# Patient Record
(demographics unavailable — no encounter records)

---

## 2018-02-02 NOTE — EMERGENCY ROOM REPORT
History of Present Illness


General


Chief Complaint:  Multiple Trauma/Fall


Source:  Patient





Present Illness


HPI


58 yo male presents to ER BIB ambulance s/p mechanical fall.


Patient complaining of left wrist pain.


Patient states pain is worse with movement.


Patient also states he is a psych patient and needs placement for "room and 

board."


Patient states he is currently taking medications for psych disorder; reports 

hx of schizophrenia; unable to determine what medications.


Patient reports he is currently living "on the street."


Patient denies fever, chest pain, SOB.


Allergies:  


Coded Allergies:  


     No Known Allergies (Unverified , 2/2/18)





Patient History


Past Medical History:  see triage record, psych hx


Past Surgical History:  unable to obtain


Pertinent Family History:  unable to obtain


Reviewed Nursing Documentation:  PMH: Agreed, PSxH: Agreed





Review of Systems


All Other Systems:  negative except mentioned in HPI





Physical Exam





Vital Signs








  Date Time  Temp Pulse Resp B/P (MAP) Pulse Ox O2 Delivery O2 Flow Rate FiO2


 


2/2/18 12:39 97.9 78 16 140/80 99 Room Air  








Sp02 EP Interpretation:  reviewed, normal


General Appearance:  no apparent distress, alert, GCS 15, non-toxic, other - 

dirty clothing, foul odor, heard speaking to himself prior to entrance into 

patient room


Head:  normocephalic, atraumatic


Eyes:  bilateral eye normal inspection, bilateral eye PERRL


ENT:  hearing grossly normal, normal pharynx, no angioedema, normal voice


Neck:  full range of motion, supple/symm/no masses


Respiratory:  chest non-tender, lungs clear, normal breath sounds, speaking 

full sentences


Cardiovascular #1:  regular rate, rhythm, no edema


Cardiovascular #2:  2+ radial (R), 2+ radial (L)


Musculoskeletal:  back normal, digits/nails normal, gait/station normal, normal 

range of motion, decreased range of motion - left wrist secondary to pain, 

other - NVI, no erythema, mild edema, tender - left wrist


Neurologic:  alert, oriented x3, responsive, motor strength/tone normal, 

sensory intact


Psychiatric:  other


Skin:  normal color, no rash, well hydrated, other - black dirt on hands and 

extremities





Medical Decision Making


PA Attestation


Dr. Celis is my supervising Physician whom patient management has been 

discussed with.


Diagnostic Impression:  


 Primary Impression:  


 Distal radius fracture, left


ER Course


Pt. presents to the ED left wrist pain and psych disorder.





DDx considered but are not limited to fracture, sprain, strain, contusion.





Patient reports he is a psych patient and needs transfer to psych facility. 


Began psych workup.





Patient was seen by Dr. Celis. Consult with Dr. Celis who contacted Dr. Jensen who will evaluate patient in ER for psych transfer.





Vital signs: are WNL, pt. is afebrile





ORDERS:


Xray left wrist.





ER COURSE:


Urine drug negative


Serum alcohol negative


An X-ray of the left wrist was ordered, results show acute fracture of the 

distal radius, per the official radiology report.


Patient provided with Toradol for pain





Volar Splint and ACE wrap was applied to the left wrist. The affected wrist was 

checked afterwards by me showing good alignment and support with distal 

neurovascular functioning intact.





Patient instructed on RICE method: rest, ice, compression, elevation.


Patient instructed to NWB.


Take medications as directed. 


Patient questions asked and answered.


ER precautions given, patient instructed to return to ER immediately for any 

new or worsening of symptoms.





Discuss treatment plan with Dr. Celis who agrees with treatment plan.





At this time pt. is stable medically.





Patient was evaluated by Dr. Jensen. Patient is not a danger to himself or 

anyone else. Patient does not meet criteria for 5150.





Dr. Jensen recommend provide patient with Risperidone and discharge patient.





Informed patient of discharge plan. Instructed patient to follow up with 

primary care provider and psych provider.


Patient provided with list of shelters, bus tokens and food.


Patient states understanding and agreement to to treatment plan. 


Patient in no acute distress, able to walk independently.


Patient ready for discharge to home.





Labs








Test


  2/2/18


13:15 2/2/18


14:05


 


White Blood Count


  4.3 K/UL


(4.8-10.8) 


 


 


Red Blood Count


  3.78 M/UL


(4.70-6.10) 


 


 


Hemoglobin


  12.0 G/DL


(14.2-18.0) 


 


 


Hematocrit


  35.5 %


(42.0-52.0) 


 


 


Mean Corpuscular Volume 94 FL (80-99)  


 


Mean Corpuscular Hemoglobin


  31.8 PG


(27.0-31.0) 


 


 


Mean Corpuscular Hemoglobin


Concent 33.9 G/DL


(32.0-36.0) 


 


 


Red Cell Distribution Width


  11.5 %


(11.6-14.8) 


 


 


Platelet Count


  188 K/UL


(150-450) 


 


 


Mean Platelet Volume


  7.9 FL


(6.5-10.1) 


 


 


Neutrophils (%) (Auto)


  61.5 %


(45.0-75.0) 


 


 


Lymphocytes (%) (Auto)


  28.1 %


(20.0-45.0) 


 


 


Monocytes (%) (Auto)


  7.7 %


(1.0-10.0) 


 


 


Eosinophils (%) (Auto)


  1.0 %


(0.0-3.0) 


 


 


Basophils (%) (Auto)


  1.7 %


(0.0-2.0) 


 


 


Sodium Level


  141 MMOL/L


(136-145) 


 


 


Potassium Level


  3.6 MMOL/L


(3.5-5.1) 


 


 


Chloride Level


  106 MMOL/L


() 


 


 


Carbon Dioxide Level


  25 MMOL/L


(21-32) 


 


 


Anion Gap


  10 mmol/L


(5-15) 


 


 


Blood Urea Nitrogen


  24 mg/dL


(7-18) 


 


 


Creatinine


  1.3 MG/DL


(0.55-1.30) 


 


 


Estimat Glomerular Filtration


Rate > 60 mL/min


(>60) 


 


 


Glucose Level


  127 MG/DL


() 


 


 


Calcium Level


  8.9 MG/DL


(8.5-10.1) 


 


 


Total Bilirubin


  0.3 MG/DL


(0.2-1.0) 


 


 


Aspartate Amino Transf


(AST/SGOT) 21 U/L (15-37) 


  


 


 


Alanine Aminotransferase


(ALT/SGPT) 20 U/L (12-78) 


  


 


 


Alkaline Phosphatase


  59 U/L


() 


 


 


Total Protein


  6.5 G/DL


(6.4-8.2) 


 


 


Albumin


  3.2 G/DL


(3.4-5.0) 


 


 


Globulin 3.3 g/dL  


 


Albumin/Globulin Ratio 1.0 (1.0-2.7)  


 


Salicylates Level


  0.6 ug/mL


(2.8-20) 


 


 


Acetaminophen Level


  < 2 MCG/ML


(10-30) 


 


 


Serum Alcohol < 3 mg/dL  


 


Urine Opiates Screen


  


  Negative


(NEGATIVE)


 


Urine Barbiturates Screen


  


  Negative


(NEGATIVE)


 


Phencyclidine (PCP) Screen


  


  Negative


(NEGATIVE)


 


Urine Amphetamines Screen


  


  Negative


(NEGATIVE)


 


Urine Benzodiazepines Screen


  


  Negative


(NEGATIVE)


 


Urine Cocaine Screen


  


  Negative


(NEGATIVE)


 


Urine Marijuana (THC) Screen


  


  Negative


(NEGATIVE)








Other X-Ray Diagnostic Results


Other X-Ray Diagnostic Results :  


   X-Ray ordered:  left wrist


   # of Views/Limited Vs Complete:  4 View


   Indication:  Pain


   EP Interpretation:  Yes


   PA Xray:  by supervising MD, and agrees with findings.


   Interpretation:  other - acute fracture of distal radius


   Impression:  Other - fracture


   PA Scribe Text


Maycol Sharif PA-C





Last Vital Signs








  Date Time  Temp Pulse Resp B/P (MAP) Pulse Ox O2 Delivery O2 Flow Rate FiO2


 


2/2/18 12:39 97.9 78 16 140/80 99 Room Air  








Disposition:  HOME, SELF-CARE


Condition:  Stable


Scripts


Ibuprofen* (MOTRIN*) 600 Mg Tablet


600 MG ORAL Q6H Y for For Pain, #30 TAB


   Prov: Yamil Sharif         2/2/18





Additional Instructions:  


Followup with primary care provider in 3 -5 days.


Take medications as directed. 


Patient questions asked and answered.


ER precautions given, patient instructed to return to ER immediately for any 

new or worsening of symptoms.











Yamil Sharif Feb 2, 2018 13:00

## 2018-02-02 NOTE — DIAGNOSTIC IMAGING REPORT
Indication: Pain

 

Findings: 3  views of the left wrist were obtained. 

 

Acute distal radius fracture with slight impaction noted. No other fractures are

seen. There is no malalignment.

 

IMPRESSION:

 

Acute distal radius fracture

## 2018-02-03 NOTE — CONSULTATION
DATE OF CONSULTATION:  02/02/2018



HISTORY OF PRESENT ILLNESS:  This is a 59-year-old male with a history of

schizoaffective disorder, bipolar type, who came in to the hospital for

"wound care".  During the evaluation, the patient is in bed, sleeping,

asking for food.  He was responding to internal stimuli.  He has been

observed eating and he is not disturbing.  He is being homeless

chronically.  He is not taking any medication currently.  His urine

toxicology is negative for any drugs.  The patient is a poor historian.

The patient stated that he is not suicidal and does not speak to his

psychiatrist.  The patient is alert and oriented times self, place, time,

and situation.



PAST PSYCHIATRIC HISTORY:  He states that he has been diagnosed with

schizoaffective disorder, bipolar type.  Denies any suicidal or homicidal

ideation.



PAST MEDICAL HISTORY:  Significant for multiple wounds.



ALLERGIES:  No known drug allergies.



SUBSTANCE ABUSE HISTORY:  He denies any illicit drug use or alcohol use.



MENTAL STATUS EXAMINATION:  The patient is oriented x3.  Mood is dysphoric.

Affect is constricted, congruent with mood.  Thought process is concrete.

Thought content, the patient is not endorsing any suicidal or homicidal

ideation.  He is having auditory hallucinations.  The patient is not an

imminent danger to self or others.



ASSESSMENT:

Axis I  Schizoaffective disorder, bipolar type.

Axis II  Deferred.

Axis III  As above.

Axis IV  Low.

Axis V  50.



PLAN:

1. The patient will be discharged with risperidone prescription.

2. The patient is not holdable and not meeting criteria for inpatient

level of care, not a 5150 case.

3. The patient will be referred to a shelter.









  ______________________________________________

  Jerri Jensen M.D.





DR:  MARCELA

D:  02/02/2018 15:59

T:  02/03/2018 00:19

JOB#:  [id number]

CC: